# Patient Record
Sex: MALE | Race: WHITE | Employment: FULL TIME | URBAN - METROPOLITAN AREA
[De-identification: names, ages, dates, MRNs, and addresses within clinical notes are randomized per-mention and may not be internally consistent; named-entity substitution may affect disease eponyms.]

---

## 2020-11-16 ENCOUNTER — HOSPITAL ENCOUNTER (OUTPATIENT)
Age: 35
Setting detail: OBSERVATION
Discharge: LEFT BEFORE DISCHARGE | End: 2020-11-16
Payer: COMMERCIAL

## 2020-11-16 ENCOUNTER — APPOINTMENT (OUTPATIENT)
Dept: GENERAL RADIOLOGY | Age: 35
End: 2020-11-16
Payer: COMMERCIAL

## 2020-11-16 ENCOUNTER — APPOINTMENT (OUTPATIENT)
Dept: CT IMAGING | Age: 35
End: 2020-11-16
Payer: COMMERCIAL

## 2020-11-16 VITALS
RESPIRATION RATE: 18 BRPM | WEIGHT: 150 LBS | SYSTOLIC BLOOD PRESSURE: 118 MMHG | BODY MASS INDEX: 19.88 KG/M2 | OXYGEN SATURATION: 100 % | HEART RATE: 76 BPM | TEMPERATURE: 97.6 F | DIASTOLIC BLOOD PRESSURE: 77 MMHG | HEIGHT: 73 IN

## 2020-11-16 PROBLEM — G45.9 TIA (TRANSIENT ISCHEMIC ATTACK): Status: ACTIVE | Noted: 2020-11-16

## 2020-11-16 LAB
ALBUMIN SERPL-MCNC: 4.6 G/DL (ref 3.5–4.6)
ALP BLD-CCNC: 77 U/L (ref 35–104)
ALT SERPL-CCNC: 7 U/L (ref 0–41)
AMPHETAMINE SCREEN, URINE: ABNORMAL
ANION GAP SERPL CALCULATED.3IONS-SCNC: 10 MEQ/L (ref 9–15)
AST SERPL-CCNC: 14 U/L (ref 0–40)
BARBITURATE SCREEN URINE: ABNORMAL
BASOPHILS ABSOLUTE: 0.1 K/UL (ref 0–0.2)
BASOPHILS RELATIVE PERCENT: 1.1 %
BENZODIAZEPINE SCREEN, URINE: ABNORMAL
BILIRUB SERPL-MCNC: 0.6 MG/DL (ref 0.2–0.7)
BILIRUBIN URINE: NEGATIVE
BLOOD, URINE: NEGATIVE
BUN BLDV-MCNC: 8 MG/DL (ref 6–20)
CALCIUM SERPL-MCNC: 9.3 MG/DL (ref 8.5–9.9)
CANNABINOID SCREEN URINE: POSITIVE
CHLORIDE BLD-SCNC: 104 MEQ/L (ref 95–107)
CLARITY: CLEAR
CO2: 25 MEQ/L (ref 20–31)
COCAINE METABOLITE SCREEN URINE: ABNORMAL
COLOR: YELLOW
CREAT SERPL-MCNC: 1.02 MG/DL (ref 0.7–1.2)
EKG ATRIAL RATE: 80 BPM
EKG P AXIS: 72 DEGREES
EKG P-R INTERVAL: 120 MS
EKG Q-T INTERVAL: 344 MS
EKG QRS DURATION: 94 MS
EKG QTC CALCULATION (BAZETT): 396 MS
EKG R AXIS: 83 DEGREES
EKG T AXIS: 76 DEGREES
EKG VENTRICULAR RATE: 80 BPM
EOSINOPHILS ABSOLUTE: 0.4 K/UL (ref 0–0.7)
EOSINOPHILS RELATIVE PERCENT: 5.1 %
GFR AFRICAN AMERICAN: >60
GFR NON-AFRICAN AMERICAN: >60
GLOBULIN: 2.2 G/DL (ref 2.3–3.5)
GLUCOSE BLD-MCNC: 100 MG/DL (ref 70–99)
GLUCOSE URINE: NEGATIVE MG/DL
HCT VFR BLD CALC: 43.4 % (ref 42–52)
HEMOGLOBIN: 15.3 G/DL (ref 14–18)
KETONES, URINE: NEGATIVE MG/DL
LEUKOCYTE ESTERASE, URINE: NEGATIVE
LYMPHOCYTES ABSOLUTE: 1.7 K/UL (ref 1–4.8)
LYMPHOCYTES RELATIVE PERCENT: 23.4 %
Lab: ABNORMAL
MAGNESIUM: 1.9 MG/DL (ref 1.7–2.4)
MCH RBC QN AUTO: 31.5 PG (ref 27–31.3)
MCHC RBC AUTO-ENTMCNC: 35.4 % (ref 33–37)
MCV RBC AUTO: 89.1 FL (ref 80–100)
METHADONE SCREEN, URINE: ABNORMAL
MONOCYTES ABSOLUTE: 0.9 K/UL (ref 0.2–0.8)
MONOCYTES RELATIVE PERCENT: 12.1 %
NEUTROPHILS ABSOLUTE: 4.2 K/UL (ref 1.4–6.5)
NEUTROPHILS RELATIVE PERCENT: 58.3 %
NITRITE, URINE: NEGATIVE
OPIATE SCREEN URINE: ABNORMAL
OXYCODONE URINE: ABNORMAL
PDW BLD-RTO: 13.2 % (ref 11.5–14.5)
PH UA: 7.5 (ref 5–9)
PHENCYCLIDINE SCREEN URINE: ABNORMAL
PLATELET # BLD: 230 K/UL (ref 130–400)
POTASSIUM SERPL-SCNC: 3.6 MEQ/L (ref 3.4–4.9)
PROPOXYPHENE SCREEN: ABNORMAL
PROTEIN UA: NEGATIVE MG/DL
RBC # BLD: 4.87 M/UL (ref 4.7–6.1)
SODIUM BLD-SCNC: 139 MEQ/L (ref 135–144)
SPECIFIC GRAVITY UA: 1.01 (ref 1–1.03)
TOTAL CK: 157 U/L (ref 0–190)
TOTAL PROTEIN: 6.8 G/DL (ref 6.3–8)
TROPONIN: <0.01 NG/ML (ref 0–0.01)
URINE REFLEX TO CULTURE: NORMAL
UROBILINOGEN, URINE: 1 E.U./DL
WBC # BLD: 7.3 K/UL (ref 4.8–10.8)

## 2020-11-16 PROCEDURE — 99283 EMERGENCY DEPT VISIT LOW MDM: CPT

## 2020-11-16 PROCEDURE — 93010 ELECTROCARDIOGRAM REPORT: CPT | Performed by: INTERNAL MEDICINE

## 2020-11-16 PROCEDURE — 71046 X-RAY EXAM CHEST 2 VIEWS: CPT

## 2020-11-16 PROCEDURE — 82550 ASSAY OF CK (CPK): CPT

## 2020-11-16 PROCEDURE — 83735 ASSAY OF MAGNESIUM: CPT

## 2020-11-16 PROCEDURE — 70450 CT HEAD/BRAIN W/O DYE: CPT

## 2020-11-16 PROCEDURE — 36415 COLL VENOUS BLD VENIPUNCTURE: CPT

## 2020-11-16 PROCEDURE — 85025 COMPLETE CBC W/AUTO DIFF WBC: CPT

## 2020-11-16 PROCEDURE — 81003 URINALYSIS AUTO W/O SCOPE: CPT

## 2020-11-16 PROCEDURE — 93005 ELECTROCARDIOGRAM TRACING: CPT | Performed by: NURSE PRACTITIONER

## 2020-11-16 PROCEDURE — G0378 HOSPITAL OBSERVATION PER HR: HCPCS

## 2020-11-16 PROCEDURE — 80053 COMPREHEN METABOLIC PANEL: CPT

## 2020-11-16 PROCEDURE — 80307 DRUG TEST PRSMV CHEM ANLYZR: CPT

## 2020-11-16 PROCEDURE — 84484 ASSAY OF TROPONIN QUANT: CPT

## 2020-11-16 PROCEDURE — 93005 ELECTROCARDIOGRAM TRACING: CPT | Performed by: EMERGENCY MEDICINE

## 2020-11-16 RX ORDER — PROMETHAZINE HYDROCHLORIDE 25 MG/1
12.5 TABLET ORAL EVERY 6 HOURS PRN
Status: CANCELLED | OUTPATIENT
Start: 2020-11-16

## 2020-11-16 RX ORDER — LABETALOL HYDROCHLORIDE 5 MG/ML
10 INJECTION, SOLUTION INTRAVENOUS EVERY 10 MIN PRN
Status: CANCELLED | OUTPATIENT
Start: 2020-11-16

## 2020-11-16 RX ORDER — POLYETHYLENE GLYCOL 3350 17 G/17G
17 POWDER, FOR SOLUTION ORAL DAILY PRN
Status: CANCELLED | OUTPATIENT
Start: 2020-11-16

## 2020-11-16 RX ORDER — ONDANSETRON 2 MG/ML
4 INJECTION INTRAMUSCULAR; INTRAVENOUS EVERY 6 HOURS PRN
Status: CANCELLED | OUTPATIENT
Start: 2020-11-16

## 2020-11-16 RX ORDER — SODIUM CHLORIDE 0.9 % (FLUSH) 0.9 %
10 SYRINGE (ML) INJECTION PRN
Status: CANCELLED | OUTPATIENT
Start: 2020-11-16

## 2020-11-16 RX ORDER — ATORVASTATIN CALCIUM 40 MG/1
80 TABLET, FILM COATED ORAL NIGHTLY
Status: CANCELLED | OUTPATIENT
Start: 2020-11-16

## 2020-11-16 RX ORDER — SODIUM CHLORIDE 0.9 % (FLUSH) 0.9 %
10 SYRINGE (ML) INJECTION EVERY 12 HOURS SCHEDULED
Status: CANCELLED | OUTPATIENT
Start: 2020-11-16

## 2020-11-16 SDOH — HEALTH STABILITY: MENTAL HEALTH: HOW OFTEN DO YOU HAVE A DRINK CONTAINING ALCOHOL?: NEVER

## 2020-11-16 ASSESSMENT — ENCOUNTER SYMPTOMS
COUGH: 0
SHORTNESS OF BREATH: 0
VOMITING: 0
BACK PAIN: 0
TROUBLE SWALLOWING: 0
WHEEZING: 0
ABDOMINAL PAIN: 0
DIARRHEA: 0
NAUSEA: 0
CHEST TIGHTNESS: 0
SORE THROAT: 0

## 2020-11-16 ASSESSMENT — PAIN DESCRIPTION - LOCATION: LOCATION: CHEST

## 2020-11-16 ASSESSMENT — PAIN SCALES - GENERAL: PAINLEVEL_OUTOF10: 6

## 2020-11-16 ASSESSMENT — PAIN DESCRIPTION - FREQUENCY: FREQUENCY: CONTINUOUS

## 2020-11-16 ASSESSMENT — HEART SCORE: ECG: 0

## 2020-11-16 NOTE — ED NOTES
Pt out desk states he does not want the MRI and he wants iv out to go home     Colby Brennan.  Lala Mohs  11/16/20 8453

## 2020-11-16 NOTE — ED PROVIDER NOTES
level 5)     ED Triage Vitals [11/16/20 1126]   BP Temp Temp Source Pulse Resp SpO2 Height Weight   (!) 138/92 97.6 °F (36.4 °C) Oral 91 20 97 % 6' 1\" (1.854 m) 150 lb (68 kg)       Physical Exam  Vitals signs and nursing note reviewed. Constitutional:       Appearance: He is well-developed. HENT:      Head: Normocephalic and atraumatic. Right Ear: External ear normal.      Left Ear: External ear normal.   Eyes:      Conjunctiva/sclera: Conjunctivae normal.      Pupils: Pupils are equal, round, and reactive to light. Neck:      Musculoskeletal: Normal range of motion and neck supple. Cardiovascular:      Rate and Rhythm: Normal rate and regular rhythm. Heart sounds: Normal heart sounds. Comments: EKG 80 BPM, RRR, No St elevation or depression. Pulmonary:      Effort: Pulmonary effort is normal. No accessory muscle usage or respiratory distress. Breath sounds: Normal breath sounds. No decreased air movement. No decreased breath sounds or wheezing. Abdominal:      General: Bowel sounds are normal. There is no distension. Palpations: Abdomen is soft. Tenderness: There is no abdominal tenderness. Musculoskeletal: Normal range of motion. Right lower leg: No edema. Left lower leg: No edema. Skin:     General: Skin is warm and dry. Neurological:      General: No focal deficit present. Mental Status: He is alert and oriented to person, place, and time. GCS: GCS eye subscore is 4. GCS verbal subscore is 5. GCS motor subscore is 6. Cranial Nerves: Cranial nerves are intact. Sensory: Sensation is intact. Motor: Motor function is intact. Coordination: Coordination is intact. Gait: Gait is intact. Deep Tendon Reflexes: Reflexes are normal and symmetric. Psychiatric:         Judgment: Judgment normal.       I reviewed the chart and agree with the midlevel provider on the care, treatment and disposition of this patient.   I did see the patient as well will of his of decisional capacity. Patient still refuses testing. I even offered to have the patient do the MRI it would only take less than half hour and then we can always call him with the results if he still wanted to leave. Patient's overriding concern was Monday and he did not want to spend the money on the testing and stated he would come back for any worsening of his condition. His mother was there and tried to talk him into staying but the patient continued to refuse. Patient leaving [address] Electronically signed by April Shelton MD on 11/18/2020 at 9:35 AM     All other labs were within normal range or not returned as of this dictation. EMERGENCY DEPARTMENT COURSE and DIFFERENTIALDIAGNOSIS/MDM:   Vitals:    Vitals:    11/16/20 1126 11/16/20 1302 11/16/20 1330 11/16/20 1400   BP: (!) 138/92 113/73 115/72 118/77   Pulse: 91 73 75 76   Resp: 20 16 16 18   Temp: 97.6 °F (36.4 °C)      TempSrc: Oral      SpO2: 97% 99% 97% 100%   Weight: 150 lb (68 kg)      Height: 6' 1\" (1.854 m)               30 yr old male with TIA and atypical chest pain. I did speak with Dr. Yeni Zambrano and an MRI was ordered d/t CT results and symptoms. Otherwise labs are unremarkable. Plan discussed with patient and he is not agreeable to stay for testing and evaluation. I did discuss in depth with the patient the risk of sudden death, worsening condition, subdural hematoma etc.  Patient again refused to stay, mother was present in room at time. Dr. Adrianna Rivera was also in with the patient and mother and discussed risk vs benefit. Patient given F/U with Dr. Yeni Zambrano and encouraged to return with any new or worsening symptoms. PROCEDURES:  Unless otherwise noted below, none     Procedures      FINAL IMPRESSION      1. TIA (transient ischemic attack)    2.  Atypical chest pain          DISPOSITION/PLAN   DISPOSITION [place] 11/16/2020 04:12:44 PM          AMIRAH Toussaint CNP (electronically signed)  Attending Emergency Physician      AMIRAH Lara - CNP  11/16/20 [id number] Germaine Wilson - CNP  11/16/20 7527 Yenny Dunham MD  11/18/20 6915

## 2020-11-16 NOTE — ED TRIAGE NOTES
Pt comes to er with c/o chest pain since 730 this morning. He  Attempted to go to work  But the pain has gotten worse and  He is now feeling sob and  Numbness and tingling in his right arm. Pt states that he  started to hyperventilate d/t panic attack but does not feel the  Panic attack now.  Pt texting on phone in triage

## 2023-10-16 ENCOUNTER — HOSPITAL ENCOUNTER (EMERGENCY)
Facility: HOSPITAL | Age: 38
Discharge: OTHER INSTITUTION | End: 2023-10-16
Attending: EMERGENCY MEDICINE

## 2023-10-16 VITALS
BODY MASS INDEX: 19.38 KG/M2 | HEIGHT: 74 IN | TEMPERATURE: 97.3 F | DIASTOLIC BLOOD PRESSURE: 88 MMHG | OXYGEN SATURATION: 99 % | WEIGHT: 151 LBS | SYSTOLIC BLOOD PRESSURE: 125 MMHG | RESPIRATION RATE: 18 BRPM | HEART RATE: 75 BPM

## 2023-10-16 DIAGNOSIS — R45.851 SUICIDAL IDEATION: Primary | ICD-10-CM

## 2023-10-16 LAB
ALBUMIN SERPL BCP-MCNC: 4.5 G/DL (ref 3.4–5)
ALP SERPL-CCNC: 54 U/L (ref 33–120)
ALT SERPL W P-5'-P-CCNC: 11 U/L (ref 10–52)
AMPHETAMINES UR QL SCN: ABNORMAL
ANION GAP SERPL CALC-SCNC: 8 MMOL/L (ref 10–20)
APAP SERPL-MCNC: <10 UG/ML
AST SERPL W P-5'-P-CCNC: 13 U/L (ref 9–39)
BARBITURATES UR QL SCN: ABNORMAL
BASOPHILS # BLD AUTO: 0.09 X10*3/UL (ref 0–0.1)
BASOPHILS NFR BLD AUTO: 1.2 %
BENZODIAZ UR QL SCN: ABNORMAL
BILIRUB SERPL-MCNC: 0.5 MG/DL (ref 0–1.2)
BUN SERPL-MCNC: 8 MG/DL (ref 6–23)
BZE UR QL SCN: ABNORMAL
CALCIUM SERPL-MCNC: 9.3 MG/DL (ref 8.6–10.3)
CANNABINOIDS UR QL SCN: ABNORMAL
CHLORIDE SERPL-SCNC: 105 MMOL/L (ref 98–107)
CO2 SERPL-SCNC: 31 MMOL/L (ref 21–32)
CREAT SERPL-MCNC: 1.12 MG/DL (ref 0.5–1.3)
EOSINOPHIL # BLD AUTO: 0.35 X10*3/UL (ref 0–0.7)
EOSINOPHIL NFR BLD AUTO: 4.7 %
ERYTHROCYTE [DISTWIDTH] IN BLOOD BY AUTOMATED COUNT: 11.9 % (ref 11.5–14.5)
ETHANOL SERPL-MCNC: <10 MG/DL
FENTANYL+NORFENTANYL UR QL SCN: ABNORMAL
GFR SERPL CREATININE-BSD FRML MDRD: 87 ML/MIN/1.73M*2
GLUCOSE SERPL-MCNC: 90 MG/DL (ref 74–99)
HCT VFR BLD AUTO: 44.8 % (ref 41–52)
HGB BLD-MCNC: 15.9 G/DL (ref 13.5–17.5)
IMM GRANULOCYTES # BLD AUTO: 0.01 X10*3/UL (ref 0–0.7)
IMM GRANULOCYTES NFR BLD AUTO: 0.1 % (ref 0–0.9)
LYMPHOCYTES # BLD AUTO: 1.84 X10*3/UL (ref 1.2–4.8)
LYMPHOCYTES NFR BLD AUTO: 24.7 %
MCH RBC QN AUTO: 31.9 PG (ref 26–34)
MCHC RBC AUTO-ENTMCNC: 35.5 G/DL (ref 32–36)
MCV RBC AUTO: 90 FL (ref 80–100)
MONOCYTES # BLD AUTO: 0.54 X10*3/UL (ref 0.1–1)
MONOCYTES NFR BLD AUTO: 7.2 %
NEUTROPHILS # BLD AUTO: 4.62 X10*3/UL (ref 1.2–7.7)
NEUTROPHILS NFR BLD AUTO: 62.1 %
NRBC BLD-RTO: 0 /100 WBCS (ref 0–0)
OPIATES UR QL SCN: ABNORMAL
OXYCODONE+OXYMORPHONE UR QL SCN: ABNORMAL
PCP UR QL SCN: ABNORMAL
PLATELET # BLD AUTO: 264 X10*3/UL (ref 150–450)
PMV BLD AUTO: 9.9 FL (ref 7.5–11.5)
POTASSIUM SERPL-SCNC: 4.7 MMOL/L (ref 3.5–5.3)
PROT SERPL-MCNC: 6.9 G/DL (ref 6.4–8.2)
RBC # BLD AUTO: 4.98 X10*6/UL (ref 4.5–5.9)
SALICYLATES SERPL-MCNC: <3 MG/DL
SARS-COV-2 RNA RESP QL NAA+PROBE: NOT DETECTED
SODIUM SERPL-SCNC: 139 MMOL/L (ref 136–145)
WBC # BLD AUTO: 7.5 X10*3/UL (ref 4.4–11.3)

## 2023-10-16 PROCEDURE — 85025 COMPLETE CBC W/AUTO DIFF WBC: CPT | Performed by: EMERGENCY MEDICINE

## 2023-10-16 PROCEDURE — 80329 ANALGESICS NON-OPIOID 1 OR 2: CPT | Performed by: EMERGENCY MEDICINE

## 2023-10-16 PROCEDURE — 99284 EMERGENCY DEPT VISIT MOD MDM: CPT | Performed by: EMERGENCY MEDICINE

## 2023-10-16 PROCEDURE — 36415 COLL VENOUS BLD VENIPUNCTURE: CPT | Performed by: EMERGENCY MEDICINE

## 2023-10-16 PROCEDURE — 99285 EMERGENCY DEPT VISIT HI MDM: CPT | Mod: 25

## 2023-10-16 PROCEDURE — 2500000001 HC RX 250 WO HCPCS SELF ADMINISTERED DRUGS (ALT 637 FOR MEDICARE OP)

## 2023-10-16 PROCEDURE — 80053 COMPREHEN METABOLIC PANEL: CPT | Performed by: EMERGENCY MEDICINE

## 2023-10-16 PROCEDURE — 80349 CANNABINOIDS NATURAL: CPT | Performed by: EMERGENCY MEDICINE

## 2023-10-16 PROCEDURE — 87635 SARS-COV-2 COVID-19 AMP PRB: CPT | Performed by: EMERGENCY MEDICINE

## 2023-10-16 PROCEDURE — 80307 DRUG TEST PRSMV CHEM ANLYZR: CPT | Performed by: EMERGENCY MEDICINE

## 2023-10-16 RX ORDER — LORAZEPAM 1 MG/1
2 TABLET ORAL ONCE
Status: COMPLETED | OUTPATIENT
Start: 2023-10-16 | End: 2023-10-16

## 2023-10-16 RX ORDER — DIPHENHYDRAMINE HCL 25 MG
50 TABLET ORAL ONCE
Status: DISCONTINUED | OUTPATIENT
Start: 2023-10-16 | End: 2023-10-16 | Stop reason: HOSPADM

## 2023-10-16 RX ORDER — LORAZEPAM 1 MG/1
TABLET ORAL
Status: COMPLETED
Start: 2023-10-16 | End: 2023-10-16

## 2023-10-16 RX ADMIN — LORAZEPAM 2 MG: 1 TABLET ORAL at 18:50

## 2023-10-16 SDOH — HEALTH STABILITY: MENTAL HEALTH: ACTIVE SUICIDAL IDEATION WITH SPECIFIC PLAN AND INTENT (PAST 1 MONTH): YES

## 2023-10-16 SDOH — HEALTH STABILITY: MENTAL HEALTH: IN THE PAST WEEK, HAVE YOU BEEN HAVING THOUGHTS ABOUT KILLING YOURSELF?: YES

## 2023-10-16 SDOH — ECONOMIC STABILITY: HOUSING INSECURITY: FEELS SAFE LIVING IN HOME: YES

## 2023-10-16 SDOH — HEALTH STABILITY: MENTAL HEALTH

## 2023-10-16 SDOH — HEALTH STABILITY: MENTAL HEALTH: WISH TO BE DEAD (PAST 1 MONTH): YES

## 2023-10-16 SDOH — HEALTH STABILITY: MENTAL HEALTH: BEHAVIORS/MOOD: ANXIOUS;ANGRY;GUARDED;IRRITABLE;RESTLESS

## 2023-10-16 SDOH — HEALTH STABILITY: MENTAL HEALTH: NEEDS EXPRESSED: DENIES

## 2023-10-16 SDOH — HEALTH STABILITY: MENTAL HEALTH: BEHAVIORS/MOOD: CALM;COOPERATIVE

## 2023-10-16 SDOH — HEALTH STABILITY: MENTAL HEALTH: ARE YOU HAVING THOUGHTS OF KILLING YOURSELF RIGHT NOW?: NO

## 2023-10-16 SDOH — HEALTH STABILITY: MENTAL HEALTH: IN THE PAST FEW WEEKS, HAVE YOU FELT THAT YOU OR YOUR FAMILY WOULD BE BETTER OFF IF YOU WERE DEAD?: YES

## 2023-10-16 SDOH — HEALTH STABILITY: MENTAL HEALTH
DEPRESSION SYMPTOMS: FEELINGS OF HELPLESSNESS;FEELINGS OF HOPELESSESS;APPETITE CHANGE;CHANGE IN ENERGY LEVEL;FEELINGS OF WORTHLESSNESS;IMPAIRED CONCENTRATION;LOSS OF INTEREST;SLEEP DISTURBANCE

## 2023-10-16 SDOH — HEALTH STABILITY: MENTAL HEALTH: HAVE YOU EVER TRIED TO KILL YOURSELF?: YES

## 2023-10-16 SDOH — HEALTH STABILITY: MENTAL HEALTH: ANXIETY SYMPTOMS: GENERALIZED

## 2023-10-16 SDOH — HEALTH STABILITY: MENTAL HEALTH: BEHAVIORS/MOOD: ANXIOUS;COOPERATIVE;SAD

## 2023-10-16 SDOH — HEALTH STABILITY: MENTAL HEALTH: SUICIDAL BEHAVIOR (3 MONTHS): YES

## 2023-10-16 SDOH — HEALTH STABILITY: MENTAL HEALTH: NON-SPECIFIC ACTIVE SUICIDAL THOUGHTS (PAST 1 MONTH): YES

## 2023-10-16 SDOH — HEALTH STABILITY: MENTAL HEALTH: IN THE PAST FEW WEEKS, HAVE YOU WISHED YOU WERE DEAD?: YES

## 2023-10-16 SDOH — HEALTH STABILITY: MENTAL HEALTH: SUICIDAL BEHAVIOR (LIFETIME): YES

## 2023-10-16 SDOH — HEALTH STABILITY: MENTAL HEALTH: BEHAVIORS/MOOD: CALM;SAD;PLEASANT;COOPERATIVE

## 2023-10-16 SDOH — HEALTH STABILITY: MENTAL HEALTH: ACTIVE SUICIDAL IDEATION WITH SOME INTENT TO ACT, WITHOUT SPECIFIC PLAN (PAST 1 MONTH): YES

## 2023-10-16 ASSESSMENT — COLUMBIA-SUICIDE SEVERITY RATING SCALE - C-SSRS
6. HAVE YOU EVER DONE ANYTHING, STARTED TO DO ANYTHING, OR PREPARED TO DO ANYTHING TO END YOUR LIFE?: YES
6. HAVE YOU EVER DONE ANYTHING, STARTED TO DO ANYTHING, OR PREPARED TO DO ANYTHING TO END YOUR LIFE?: YES
4. HAVE YOU HAD THESE THOUGHTS AND HAD SOME INTENTION OF ACTING ON THEM?: YES
5. HAVE YOU STARTED TO WORK OUT OR WORKED OUT THE DETAILS OF HOW TO KILL YOURSELF? DO YOU INTEND TO CARRY OUT THIS PLAN?: YES
1. IN THE PAST MONTH, HAVE YOU WISHED YOU WERE DEAD OR WISHED YOU COULD GO TO SLEEP AND NOT WAKE UP?: YES
2. HAVE YOU ACTUALLY HAD ANY THOUGHTS OF KILLING YOURSELF?: YES

## 2023-10-16 ASSESSMENT — PAIN SCALES - GENERAL
PAINLEVEL_OUTOF10: 3
PAINLEVEL_OUTOF10: 0 - NO PAIN

## 2023-10-16 ASSESSMENT — LIFESTYLE VARIABLES
PRESCIPTION_ABUSE_PAST_12_MONTHS: NO
SUBSTANCE_ABUSE_PAST_12_MONTHS: YES

## 2023-10-16 ASSESSMENT — PAIN - FUNCTIONAL ASSESSMENT
PAIN_FUNCTIONAL_ASSESSMENT: 0-10
PAIN_FUNCTIONAL_ASSESSMENT: 0-10

## 2023-10-16 ASSESSMENT — PAIN DESCRIPTION - LOCATION: LOCATION: HEAD

## 2023-10-16 NOTE — PROGRESS NOTES
EPAT - Social Work Psychiatric Assessment    Arrival Details  Mode of Arrival: Ambulance  Admission Source:  (Dental provider's office)  Admission Type: Voluntary  EPAT Assessment Start Date: 10/16/23  EPAT Assessment Start Time: 1403  Name of : BECKY Sahu LSW    History of Present Illness  Admission Reason: suicide attempt  HPI: Patient is a 38 year old  male, with a history of unspecified depression, presenting to the ED after being pink slipped by [facility] for suicide attempt. Reviewed triage note, provider note, [place] suicide risk scale and community records, patient attempted suicide last night putting a plastic bag over his head trying to suffocate himself. He states he aborted when “the bag got closer to my face because I am severely claustrophobic. Patient reports he has been feeling more depressed and helpless in the past week as his friends blocked him and not wanted to be around him anymore. He denies homicidal ideation, visual/auditory hallucinations or delusional thinking. Patient has never been formally diagnosed with any mental illness. He is prescribed with Trazodone for previous anger outburst. He has no previous inpatient admissions or suicide attempts.     Readmission Information   Readmission within 30 Days: No    Additional Symptoms - Adult  Generalized Anxiety Disorder: Difficult to control worry, Difficulity concentrating, Easily fatigued, Excessive anxiety/worry, Sleep disturbance  Obsessive Compulsive Disorder: No problems reported or observed.  Panic Attack: No problems reported or observed.  Post Traumatic Stress Disorder: No problems reported or observed.  Delirium: No problems reported or observed.    Past Psychiatric History/Meds/Treatments  Past Psychiatric History: hx of unspecified depression, never been formally diagnosed with any mental illness//no prrior inpatient admission//no hx of SA or self harming behaviors  Past Psychiatric Meds/Treatments:  "trazodone  Past Violence/Victimization History: none    Current Mental Health Contacts   Name/Phone Number: none   Last Appointment Date: none  Provider Name/Phone Number: none  Provider Last Appointment Date: none    Support System: Immediate family    Living Arrangement: House    Home Safety  Feels Safe Living in Home: Yes    Income Information  Employment Status for: Patient  Employment Status: Employed (works at [employer] started last Monday)  Income Source: Employed    [place] Service/Education History  Current or Previous  Service: None  Education Level: High school  History of Learning Problems: No  History of School Behavior Problems: No    Social/Cultural History  Social History: [place] citizen, pt is his own guardian  Cultural Requests During Hospitalization: none reported  Spiritual Requests During Hospitalization: none reported  Important Activities:  (\"don't want to do anything\")    Legal  Legal Considerations: Patient/ Family Ability to Make Healthcare Decisions  Assistance with Managing/Advocating Healthcare Needs:  (none)    Drug Screening  Have you used any substances (canabis, cocaine, heroin, hallucinogens, inhalants, etc.) in the past 12 months?: Yes  Have you used any prescription drugs other than prescribed in the past 12 months?: No  Is a toxicology screen needed?: Yes    Stage of Change  Stage of Change:  (n/a)  History of Treatment:  (n/a)  Type of Treatment Offered:  (n/a)  Treatment Offered:  (n/a)  Duration of Substance Use: n/a  Frequency of Substance Use: n/a  Age of First Substance Use: n/a    Psychosocial  Psychosocial (WDL): Within Defined Limits  Affect: Appropriate to circumstances  Needs Expressed: Emotional    Orientation  Orientation Level: Oriented X4    General Appearance  Motor Activity: Unremarkable  Speech Pattern:  (regular rate and tone)  General Attitude: Cooperative  Appearance/Hygiene: Unremarkable    Thought Process  Coherency:  (linear and " organized)  Content: Unremarkable  Delusions:  (none)  Perception: Not altered  Hallucination: None  Judgment/Insight: Impaired  Confusion: None  Cognition: Appropriate judgement    Sleep Pattern  Sleep Pattern: Difficulty falling asleep, Disturbed/interrupted sleep    Risk Factors  Self Harm/Suicidal Ideation Plan: attempted to suffocate himself with a plastic bag yesterday  Previous Self Harm/Suicidal Plans: none  Risk Factors: Male    Violence Risk Assessment  Assessment of Violence: None noted  Thoughts of Harm to Others: No    Ability to Assess Risk Screen  Risk Screen - Ability to Assess: Able to be screened  Ask Suicide-Screening Questions  1. In the past few weeks, have you wished you were dead?: Yes  2. In the past few weeks, have you felt that you or your family would be better off if you were dead?: Yes  3. In the past week, have you been having thoughts about killing yourself?: Yes  4. Have you ever tried to kill yourself?: Yes  How did you try to kill yourself?: attempted to suffocate himself with a plastic bag yesterday  When did you try to kill yourself?: 10/15/2023  5. Are you having thoughts of killing yourself right now?: No  Calculated Risk Score: Potential Risk  [place] Suicide Severity Rating Scale (Screener/Recent Self-Report)  1. Wish to be Dead (Past 1 Month): Yes  2. Non-Specific Active Suicidal Thoughts (Past 1 Month): Yes  3. Active Suicidal Ideation with any Methods (Not Plan) Without Intent to Act (Past 1 Month): Yes  4. Active Suicidal Ideation with Some Intent to Act, Without Specific Plan (Past 1 Month): Yes  5. Active Suicidal Ideation with Specific Plan and Intent (Past 1 Month): Yes  6. Suicidal Behavior (Lifetime): Yes  6. Suicidal Behavior (3 Months): Yes  6. Suicidal Behavior (Description): attempted to suffocate himself with a plastic bag yesterday  Calculated C-SSRS Risk Score (Lifetime/Recent): High Risk  Step 1: Risk Factors  Current & Past Psychiatric Dx: Mood  disorder  Presenting Symptoms: Anxiety and/or panic, Hopelessness or despair  Precipitants/Stressors: Triggering events leading to humiliation, shame, and/or despair (e.g. loss of relationship, financial or health status) (real or anticipated), Inadequate social supports, Social isolation  Change in Treatment: Non-compliant or not receiving treatment  Access to Lethal Methods : No  Step 2: Protective Factors   Protective Factors Internal: Identifies reasons for living  Protective Factors External: Cultural, spiritual and/or moral attitudes against suicide  Step 3: Suicidal Ideation Intensity  Most Severe Suicidal Ideation Identified: attempted to suffocate himself with a plastic bag yesterday  How Many Times Have You Had These Thoughts: 2-5 times in a week  When You Have the Thoughts How Long do They Last : 1-4 hours/a lot of the time  Could/Can You Stop Thinking About Killing Yourself or Wanting to Die if You Want to: Can control thoughts with a lot of difficulty  Are There Things - Anyone or Anything - That Stopped You From Wanting to Die or Acting on: Deterrents most likely did not stop you  What Sort of Reasons Did You Have For Thinking About Wanting to Die or Killing Yourself: Completely to end or stop the pain (you couldn't go on living with the pain or how you were feeling)  Total Score: 19  Step 5: Documentation  Risk Level: High suicide risk    Patient is a 38 year old  male, with a history of unspecified depression, presenting to the ED after being pink slipped by [facility] for suicide attempt. Prior to assessment, patient is calm and cooperative in the ED, comply with all lab works. Upon assessment, he presents as anxious with dysphoric affect. Patient endorses difficulty controlling worries, excessive worries, sleep disturbance, loss of appetite, anhedonia, easily fatigue, hard to concentrate, helplessness, hopelessness, racing thoughts, irritability and impulsivity. He denies homicidal ideation,  visual/auditory hallucinations or delusional thinking. Patient reports he is currently residing with his mother in the house and feels safe living there. He states his friends started to block him and stop taking to him last week, “they said they are tired of me, sick of me, don't want to see me ever again”. Patient reports he has been feeling worthless, helpless, hopeless and depressed over the weekend, “my cousin was here over the weekend to talk to me, but after she left, I started to feel worse and wants to end everything”. He states he took a [employer] plastic bag over his head and tied the handles tightly around his neck, wishing “he can naturally pass out when falling asleep”. He aborted the attempt when “the air gets less and the bag got closer to my face, I am severely claustrophobic so I freaked out and teared it apart”. Patient states he does not want to feel like this anymore, “I keep thinking about this, I can't sleep, I don't want to eat, I just want to lie down everyday and I hate feeling this way”. He denies access to firearms but has a collection of knives, “I don't think I will do anything with my knives, the only rule I have with killing myself is that I don't want to feel pain in the process”. Patient is able to identify family as the reason for living, however unable to safety plan with healthy coping skills, adequate social support, or outpatient connections. Attempted to reach out to emergency contact, unable to connect, left voicemail.    Due to his SI/SA symptoms, helplessness, hopelessness, limited social/outpatient support, loss of interest, irritability and impulsivity, patient continues to be considered as an increasing risk of harm to himself and has been psychiatrically decompensated. He is being recommended for psychiatric hospitalization for safety and stabilization, Dr. Smith in agreement.    Diagnostic Impression: Unspecified depression  Plan of care: inpatient  admission  Agitation assessment: no agitation, no PRN meds or restraints needed      Psychiatric Impression and Plan of Care  Specific Resources Provided to Patient: psychiatric hospitalization  CM Notified: unable to connect  PHP/IOP Recommended: none  Specific Information Provided for PHP/IOP: none    Outcome/Disposition  Patient's Perception of Outcome Achieved: patient agrees  Assessment, Recommendations and Risk Level Reviewed with: Dr. Smith  Contact Name: none  Contact Number(s): none  Contact Relationship: non  EPAT Assessment Completed Date: 10/16/23  EPAT Assessment Completed Time: 1430

## 2023-10-16 NOTE — ED TRIAGE NOTES
"Pt to ED from [facility] and [place] for c/o suicide attempt yesterday.  Pt pink slipped by [facility].  Patient reports that last night he tied a plastic bag over his head in attempt to commit suicide.  Pt aborted attempt when \"the bag got closer to my face because I am claustrophobic\".  Pt states recent troubles with \"friends block me and not wanting to be around me\".  Pt states THC use.  Calm and cooperative. Denies HI.  Denies AVH.  Respirations even and unlabored.  No acute distress noted.    "

## 2023-10-16 NOTE — SIGNIFICANT EVENT
Application for Emergency Admission      Ready for Transfer?  Is the patient medically cleared for transfer to inpatient psychiatry: Yes  Has the patient been accepted to an inpatient psychiatric hospital: Yes    Application for Emergency Admission  IN ACCORDANCE WITH [place]122[place]10 [place]  The Chief Clinical Officer of: [facility]  10/16/2023 .5:56 PM    Reason for Hospitalization  The undersigned has reason to believe that: Rod Jameson Is a mentally ill person subject to hospitalization by court order under division B Section 5122.01 of the Revised Code, i.e., this person:    1.Yes  Represents a substantial risk of physical harm to self as manifested by evidence of threats of, or attempts at, suicide or serious self-inflicted bodily harm    2.Yes Represents a substantial risk of physical harm to others as manifested by evidence of recent homicidal or other violent behavior, evidence of recent threats that place another in reasonable fear of violent behavior and serious physical harm, or other evidence of present dangerousness    3.No Represents a substantial and immediate risk of serious physical impairment or injury to self as manifested by  evidence that the person is unable to provide for and is not providing for the person's basic physical needs because of the person's mental illness and that appropriate provision for those needs cannot be made  immediately available in the community    4.Yes Would benefit from treatment in a hospital for his mental illness and is in need of such treatment as manifested by evidence of behavior that creates a grave and imminent risk to substantial rights of others or  himself.    5.No Would benefit from treatment as manifested by evidence of behavior that indicates all of the following:       (a) The person is unlikely to survive safely in the community without supervision, based on a clinical determination.       (b) The person has a history of lack of compliance with  treatment for mental illness and one of the following applies:      (i) At least twice within the thirty-six months prior to the filing of an affidavit seeking court-ordered treatment of the person under section 5122.111 of the Revised Code, the lack of compliance has been a significant factor in necessitating hospitalization in a hospital or receipt of services in a forensic or other mental health unit of a correctional facility, provided that the thirty-six-month period shall be extended by the length of any hospitalization or incarceration of the person that occurred within the thirty-six-month period.      (ii) Within the forty-eight months prior to the filing of an affidavit seeking court-ordered treatment of the person under section 5122.111 of the Revised Code, the lack of compliance resulted in one or more acts of serious violent behavior toward self or others or threats of, or attempts at, serious physical harm to self or others, provided that the forty-eight-month period shall be extended by the length of any hospitalization or incarceration of the person that occurred within the forty-eight-month period.      (c) The person, as a result of mental illness, is unlikely to voluntarily participate in necessary treatment.       (d) In view of the person's treatment history and current behavior, the person is in need of treatment in order to prevent a relapse or deterioration that would be likely to result in substantial risk of serious harm to the person or others.    (e) Represents a substantial risk of physical harm to self or others if allowed to remain at liberty pending examination.    Therefore, it is requested that said person be admitted to the above named facility.    STATEMENT OF BELIEF    Must be filled out by one of the following: a psychiatrist, licensed physician, licensed clinical psychologist, health or ,  or .  (Statement shall include the circumstances under  which the individual was taken into custody and the reason for the person's belief that hospitalization is necessary. The statement shall also include a reference to efforts made to secure the individual's property at his residence if he was taken into custody there. Every reasonable and appropriate effort should be made to take this person into custody in the least conspicuous manner possible.)    Patient will benefit from treatment and stabilization in a closely monitored behavioral health.  For patient's safety and wellbeing    Luis Bruce MD 10/16/2023     _____________________________________________________________   Place of Employment: [facility]    STATEMENT OF OBSERVATION BY PSYCHIATRIST, LICENSED PHYSICIAN, OR LICENSED CLINICAL PSYCHOLOGIST, IF APPLICABLE    Place of Observation (e.g., [facility] center, general hospital, office, emergency facility)  (If applicable, please complete)    Luis Bruce MD 10/16/2023    _____________________________________________________________

## 2023-10-16 NOTE — ED PROVIDER NOTES
HPI   Chief Complaint   Patient presents with    Suicide Attempt     Yesterday tied a plastic bag over head       Patient states that he got really overwhelmed because his friends are cutting him off nobody wants to talk to him , setting up old feelings feels very overwhelmed and yesterday actually put a bag over his head and tried to kill himself did not work and then today went to a local clinic requesting help and they sent him here.                        No data recorded                Patient History   No past medical history on file.  No past surgical history on file.  No family history on file.  Social History     Tobacco Use    Smoking status: Not on file    Smokeless tobacco: Not on file   Substance Use Topics    Alcohol use: Not on file    Drug use: Not on file       Physical Exam   ED Triage Vitals [10/16/23 1310]   Temp Heart Rate Resp BP   36.3 °C (97.3 °F) 79 18 122/81      SpO2 Temp Source Heart Rate Source Patient Position   100 % Temporal -- Lying      BP Location FiO2 (%)     Right arm --       Physical Exam  Vitals and nursing note reviewed. Exam conducted with a chaperone present.   Constitutional:       Appearance: Normal appearance.   HENT:      Head: Normocephalic and atraumatic.      Nose: Nose normal.      Mouth/Throat:      Mouth: Mucous membranes are moist.   Eyes:      Pupils: Pupils are equal, round, and reactive to light.   Cardiovascular:      Rate and Rhythm: Normal rate and regular rhythm.   Pulmonary:      Effort: Pulmonary effort is normal.   Abdominal:      Palpations: Abdomen is soft.   Musculoskeletal:         General: Normal range of motion.      Cervical back: Normal range of motion.   Skin:     General: Skin is warm and dry.      Capillary Refill: Capillary refill takes less than 2 seconds.   Neurological:      General: No focal deficit present.      Mental Status: He is alert.   Psychiatric:         Mood and Affect: Mood normal.       ED Course & MDM        Medical Decision  Making  My differential diagnosis  Acute depression acute stress-induced mood disorder, acute drug-induced mood disorder,  I ordered a CBC chemistry drug tox and alcohol levels, also consult for psychiatry was put in and patient's further work-up and management are done based upon the results of the test ordered  Labs Reviewed  COMPREHENSIVE METABOLIC PANEL - Abnormal     Glucose                       90                     Sodium                        139                    Potassium                     4.7                    Chloride                      105                    Bicarbonate                   31                     Anion Gap                     8 (*)                  Urea Nitrogen                 8                      Creatinine                    1.12                   eGFR                          87                     Calcium                       9.3                    Albumin                       4.5                    Alkaline Phosphatase          54                     Total Protein                 6.9                    AST                           13                     Bilirubin, Total              0.5                    ALT                           11                  DRUG SCREEN, URINE WITH REFLEX TO CONFIRMATION - Abnormal     Amphetamine Screen, Urine                            Barbiturate Screen, Urine                            Benzodiazepines Screen, Urine                          Cannabinoid Screen, Urine       (*)                  Cocaine Metabolite Screen, Urine                          Fentanyl Screen, Urine                               Opiate Screen, Urine                                 Oxycodone Screen, Urine                              PCP Screen, Urine                                      Narrative: Drug screen results are presumptive and should not be used to assess                 compliance with prescribed medication. Definitive confirmatory drug testing                  has been added to this sample for any positive screen result and will be                reported separately.                                 Toxicology screening results are reported qualitatively. The concentration must                be greater than or equal to the cutoff to be reported as positive. The concentration                 at which the screening test can detect an individual drug or metabolite varies.                 The absence of expected drug(s) and/or drug metabolite(s) may indicate non-compliance,                 inappropriate timing of specimen collection relative to drug administration, poor drug                 absorption, diluted/adulterated urine, or limitations of testing. For medical purposes                 only; not valid for forensic use.                                Interpretive questions should be directed to the laboratory medical directors.  ACUTE TOXICOLOGY PANEL, BLOOD - Normal     Acetaminophen                 <10.0                  Salicylate                    <3                     Alcohol                       <10                 SARS-COV-2 PCR, SCREEN ASYMPTOMATIC - Normal     Coronavirus 2019, PCR                                  Narrative: This assay has received FDA Emergency Use Authorization (EUA) and is only authorized for the duration of time that circumstances exist to justify the authorization of the emergency use of in vitro diagnostic tests for the detection of SARS-CoV-2 virus and/or diagnosis of COVID-19 infection under section 564(b)(1) of the Act, 21 U.S.C. 360bbb-3(b)(1). This assay is an in vitro diagnostic nucleic acid amplification test for the qualitative detection of SARS-CoV-2 from nasopharyngeal specimens and has been validated for use at [facility]. Negative results do not preclude COVID-19 infections and should not be used as the sole basis for diagnosis, treatment, or other management decisions.                  CBC WITH  AUTO DIFFERENTIAL     WBC                           7.5                    nRBC                          0.0                    RBC                           4.98                   Hemoglobin                    15.9                   Hematocrit                    44.8                   MCV                           90                     MCH                           31.9                   MCHC                          35.5                   RDW                           11.9                   Platelets                     264                    MPV                           9.9                    Neutrophils %                 62.1                   Immature Granulocytes %, Automated   0.1                    Lymphocytes %                 24.7                   Monocytes %                   7.2                    Eosinophils %                 4.7                    Basophils %                   1.2                    Neutrophils Absolute          4.62                   Immature Granulocytes Absolute, Au*   0.01                   Lymphocytes Absolute          1.84                   Monocytes Absolute            0.54                   Eosinophils Absolute          0.35                   Basophils Absolute            0.09                THC (MARIJUANA), URINE, CONFIRMATION     No orders to display      EKG interpreted by me shows a normal sinus rhythm rate of 70 normal QRS and normal ST segments.    Patient was medically cleared and then it is accepted by [facility] and will be transferred there.        Procedure  Procedures     Luis Bruce MD  10/16/23 5358

## 2023-10-19 LAB — CARBOXYTHC UR-MCNC: >500 NG/ML

## 2023-11-11 ENCOUNTER — HOSPITAL ENCOUNTER (OUTPATIENT)
Dept: CARDIOLOGY | Facility: HOSPITAL | Age: 38
Discharge: HOME | End: 2023-11-11

## 2023-11-11 PROCEDURE — 93005 ELECTROCARDIOGRAM TRACING: CPT
